# Patient Record
Sex: MALE | Race: OTHER | NOT HISPANIC OR LATINO
[De-identification: names, ages, dates, MRNs, and addresses within clinical notes are randomized per-mention and may not be internally consistent; named-entity substitution may affect disease eponyms.]

---

## 2024-03-17 ENCOUNTER — APPOINTMENT (OUTPATIENT)
Dept: ORTHOPEDIC SURGERY | Facility: CLINIC | Age: 64
End: 2024-03-17
Payer: COMMERCIAL

## 2024-03-17 ENCOUNTER — NON-APPOINTMENT (OUTPATIENT)
Age: 64
End: 2024-03-17

## 2024-03-17 VITALS — HEIGHT: 67 IN | BODY MASS INDEX: 23.86 KG/M2 | WEIGHT: 152 LBS

## 2024-03-17 DIAGNOSIS — Z78.9 OTHER SPECIFIED HEALTH STATUS: ICD-10-CM

## 2024-03-17 DIAGNOSIS — S49.92XA UNSPECIFIED INJURY OF LEFT SHOULDER AND UPPER ARM, INITIAL ENCOUNTER: ICD-10-CM

## 2024-03-17 PROBLEM — Z00.00 ENCOUNTER FOR PREVENTIVE HEALTH EXAMINATION: Status: ACTIVE | Noted: 2024-03-17

## 2024-03-17 PROCEDURE — A4565: CPT | Mod: LT

## 2024-03-17 PROCEDURE — 99204 OFFICE O/P NEW MOD 45 MIN: CPT

## 2024-03-17 PROCEDURE — 73110 X-RAY EXAM OF WRIST: CPT | Mod: 50

## 2024-03-17 PROCEDURE — 73030 X-RAY EXAM OF SHOULDER: CPT | Mod: LT

## 2024-03-17 PROCEDURE — L3908: CPT | Mod: LT

## 2024-03-17 NOTE — HISTORY OF PRESENT ILLNESS
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 Pain overall has improved since onset. He has been taking Advil.  No numbness, tingling or functional limitations.  He was able to lift yesterday as pain improved. Prior hx L CTR.    [FreeTextEntry1] : b/l hands

## 2024-03-17 NOTE — IMAGING
[Left] : left shoulder [No acute displaced fracture or dislocation] : No acute displaced fracture or dislocation [Degenerative change] : Degenerative change [Bilateral] : wrists bilaterally [FreeTextEntry8] : diffuse [FreeTextEntry1] : ? calcific density vs inferior glenoid fx

## 2024-03-17 NOTE — PHYSICAL EXAM
[Sitting] : sitting [Moderate] : moderate [Mild] : mild [TWNoteComboBox7] : False [Bilateral] : hand bilaterally [FreeTextEntry3] : + mild dorsal L hand swelling [] : palpable radial pulse [de-identified] : mild dorsal mid carpals [FreeTextEntry9] : stiffness with DF, no pain to make closed fist, 5/5  strength

## 2024-03-17 NOTE — ASSESSMENT
[FreeTextEntry1] : 63 M with L shoulder, wrist contusion/sprain s/p trip and fall 3/15/24  1) xrays ? inferior glenoid fx, CT scan to evaluate 2) Sling LUE, come out of sling as reviewed for elbow motion 3) wrist splint, no lifting LUE 4) Advil/Tylenol BID-TID with GI precautions 5) Ice 6) [place] to the city for work, OOW note given 7) Return with Dr. Mcmahon for further evaluation, CT review and consult

## 2024-03-19 ENCOUNTER — APPOINTMENT (OUTPATIENT)
Dept: ORTHOPEDIC SURGERY | Facility: CLINIC | Age: 64
End: 2024-03-19